# Patient Record
Sex: FEMALE | Race: WHITE | URBAN - METROPOLITAN AREA
[De-identification: names, ages, dates, MRNs, and addresses within clinical notes are randomized per-mention and may not be internally consistent; named-entity substitution may affect disease eponyms.]

---

## 2020-05-27 ENCOUNTER — HISTORICAL (OUTPATIENT)
Dept: ENDOSCOPY | Facility: HOSPITAL | Age: 40
End: 2020-05-27

## 2020-05-27 LAB — POC BETA-HCG (QUAL): NEGATIVE

## 2022-04-10 ENCOUNTER — HISTORICAL (OUTPATIENT)
Dept: ADMINISTRATIVE | Facility: HOSPITAL | Age: 42
End: 2022-04-10

## 2022-04-28 VITALS
HEIGHT: 63 IN | SYSTOLIC BLOOD PRESSURE: 143 MMHG | WEIGHT: 293 LBS | BODY MASS INDEX: 51.91 KG/M2 | DIASTOLIC BLOOD PRESSURE: 89 MMHG

## 2022-05-03 NOTE — HISTORICAL [facility] CERNER
This is a historical note converted from [employer]. Formatting and pictures may have been removed.  Please reference [employer] for original formatting and attached multimedia. History of Present Illness  Ms. Williamson is a 40-year-old  female with a history of hypothyroidism?here for a colonoscopy for history of adenomatous polyp, rectal bleeding.??  ?   She reports one episode of red blood mixed in her stool with a bowel movement a few weeks ago?and no further episodes since that time.? She has 1 bowel movement every 2 to 3 days?and denies?having to take anything?to aid with regulation of her bowel habits.? She denies straining or sitting on the toilet for long periods of time.? She denies melena, fecal urgency, fecal incontinence, tenesmus, or pain with defecation.? She denies?any history of hemorrhoidal disease.? Her appetite is good and her weight is stable.? She denies nocturnal symptoms, fever, chills, nausea, vomiting, or abdominal pain.? She denies having any recent lab work or imaging done.  ?   She had a colonoscopy with Dr. Galarza October 26, 2015 with findings of tubular adenomatous polyp in the rectosigmoid region with a recommended recall of 5 years.??She denies ever having an EGD done.? She denies regular NSAID use or use of blood thinners. ?She denies tobacco or alcohol use.??Her mother has a history of colon cancer diagnosed at age 52.  Review of Systems  Comprehensive Review of Systems performed with no exceptions other than as noted in HPI.  ?  Physical Exam  Vitals & Measurements  T:?37? ?C (Oral)? HR:?71(Monitored)? RR:?18? BP:?116/72? SpO2:?100%? WT:?64?kg? BMI:?25?  General:?well-developed well-nourished in no acute distress  Eye: PERRLA, EOMI, clear conjunctiva  HENT:? oropharynx without erythema/exudate, oropharynx and nasal mucosal surfaces moist  Neck:? no thyromegaly or lymphadenopathy, trachea midline  Respiratory:?symmetrical chest expansion and respiratory effort, clear to  auscultation bilaterally  Cardiovascular:?regular rate and rhythm without murmurs, gallops or rubs  Gastrointestinal:?soft, non-tender, non-distended with normal bowel sounds, without masses to palpation  Integumentary: no rashes or skin lesions present  Neurologic: cranial nerves intact, no asterixis, awake, alert, and oriented  Psych: good insight, appropriate mood, normal affect  ?  Assessment/Plan  Ms. Williamson is a 40-year-old  female with a history of hypothyroidism?here for a colonoscopy for history of adenomatous polyp, rectal bleeding.??  ?  Risks, benefits, and alternatives of the procedure discussed.?  Will proceed with endoscopic procedure as scheduled.   Problem List/Past Medical History  Ongoing  Morbid obesity  Personal history of colonic polyps  Historical  No qualifying data  Procedure/Surgical History  C Section (12/05/2018)   Medications  Inpatient  TC1201 1,000 mL, 1000 mL, IV  Home  ethinyl estradiol-norgestimate 35 mcg-0.25 mg oral tablet, 1 tab(s), Oral, Daily  levothyroxine 125 mcg (0.125 mg) oral tablet, 125 mcg= 1 tab(s), Oral, Daily  Allergies  No Known Allergies  Social History  Abuse/Neglect  No, 05/27/2020  Alcohol  Current, Beer, Wine, Several times per day, 05/18/2020  Tobacco  Never (less than 100 in lifetime), N/A, 05/27/2020  Family History  Mother: History is negative

## 2024-08-29 ENCOUNTER — PATIENT MESSAGE (OUTPATIENT)
Dept: CARDIOLOGY | Facility: CLINIC | Age: 44
End: 2024-08-29